# Patient Record
Sex: MALE | Race: WHITE | NOT HISPANIC OR LATINO | Employment: FULL TIME | URBAN - METROPOLITAN AREA
[De-identification: names, ages, dates, MRNs, and addresses within clinical notes are randomized per-mention and may not be internally consistent; named-entity substitution may affect disease eponyms.]

---

## 2021-06-16 PROBLEM — K21.9 CHRONIC GERD: Status: ACTIVE | Noted: 2018-06-20

## 2021-06-16 PROBLEM — E11.9 NEW ONSET TYPE 2 DIABETES MELLITUS (H): Status: ACTIVE | Noted: 2017-12-01

## 2021-11-03 ENCOUNTER — APPOINTMENT (OUTPATIENT)
Dept: CT IMAGING | Facility: CLINIC | Age: 61
End: 2021-11-03
Attending: EMERGENCY MEDICINE
Payer: COMMERCIAL

## 2021-11-03 ENCOUNTER — APPOINTMENT (OUTPATIENT)
Dept: CARDIOLOGY | Facility: CLINIC | Age: 61
End: 2021-11-03
Attending: FAMILY MEDICINE
Payer: COMMERCIAL

## 2021-11-03 ENCOUNTER — HOSPITAL ENCOUNTER (OUTPATIENT)
Facility: CLINIC | Age: 61
Discharge: HOME | End: 2021-11-04
Attending: EMERGENCY MEDICINE | Admitting: FAMILY MEDICINE
Payer: COMMERCIAL

## 2021-11-03 ENCOUNTER — APPOINTMENT (OUTPATIENT)
Dept: MRI IMAGING | Facility: CLINIC | Age: 61
End: 2021-11-03
Attending: FAMILY MEDICINE
Payer: COMMERCIAL

## 2021-11-03 DIAGNOSIS — G04.90 ENCEPHALITIS: ICD-10-CM

## 2021-11-03 DIAGNOSIS — R51.9 ACUTE NONINTRACTABLE HEADACHE, UNSPECIFIED HEADACHE TYPE: ICD-10-CM

## 2021-11-03 PROBLEM — R55 SYNCOPE: Status: ACTIVE | Noted: 2021-11-03

## 2021-11-03 PROBLEM — D69.6 THROMBOCYTOPENIA (H): Status: ACTIVE | Noted: 2021-11-03

## 2021-11-03 LAB
ALBUMIN SERPL-MCNC: 3.5 G/DL (ref 3.5–5)
ALP SERPL-CCNC: 97 U/L (ref 45–120)
ALT SERPL W P-5'-P-CCNC: 59 U/L (ref 0–45)
ANION GAP SERPL CALCULATED.3IONS-SCNC: 13 MMOL/L (ref 5–18)
APPEARANCE CSF: CLEAR
AST SERPL W P-5'-P-CCNC: 79 U/L (ref 0–40)
ATRIAL RATE - MUSE: 99 BPM
B BURGDOR IGG+IGM SER QL: 0.25
BASOPHILS # BLD MANUAL: 0 10E3/UL (ref 0–0.2)
BASOPHILS NFR BLD MANUAL: 0 %
BILIRUB SERPL-MCNC: 0.9 MG/DL (ref 0–1)
BUN SERPL-MCNC: 14 MG/DL (ref 8–22)
C GATTII+NEOFOR DNA CSF QL NAA+NON-PROBE: NEGATIVE
C REACTIVE PROTEIN LHE: 11.1 MG/DL (ref 0–0.8)
CALCIUM SERPL-MCNC: 9.2 MG/DL (ref 8.5–10.5)
CHLORIDE BLD-SCNC: 101 MMOL/L (ref 98–107)
CMV DNA CSF QL NAA+NON-PROBE: NEGATIVE
CO2 SERPL-SCNC: 23 MMOL/L (ref 22–31)
COLOR CSF: COLORLESS
CREAT SERPL-MCNC: 0.98 MG/DL (ref 0.7–1.3)
DIASTOLIC BLOOD PRESSURE - MUSE: 69 MMHG
E COLI K1 AG CSF QL: NEGATIVE
EOSINOPHIL # BLD MANUAL: 0 10E3/UL (ref 0–0.7)
EOSINOPHIL NFR BLD MANUAL: 0 %
ERYTHROCYTE [DISTWIDTH] IN BLOOD BY AUTOMATED COUNT: 13.1 % (ref 10–15)
ERYTHROCYTE [SEDIMENTATION RATE] IN BLOOD BY WESTERGREN METHOD: 19 MM/HR (ref 0–15)
EV RNA SPEC QL NAA+PROBE: NEGATIVE
GFR SERPL CREATININE-BSD FRML MDRD: 83 ML/MIN/1.73M2
GLUCOSE BLD-MCNC: 158 MG/DL (ref 70–125)
GLUCOSE BLDC GLUCOMTR-MCNC: 108 MG/DL (ref 70–99)
GLUCOSE BLDC GLUCOMTR-MCNC: 126 MG/DL (ref 70–99)
GLUCOSE BLDC GLUCOMTR-MCNC: 162 MG/DL (ref 70–99)
GLUCOSE CSF-MCNC: 93 MG/DL (ref 40–75)
GP B STREP DNA CSF QL NAA+NON-PROBE: NEGATIVE
GRAM STAIN RESULT: NORMAL
HAEM INFLU DNA CSF QL NAA+NON-PROBE: NEGATIVE
HBA1C MFR BLD: 6.7 %
HCT VFR BLD AUTO: 43.3 % (ref 40–53)
HGB BLD-MCNC: 14.2 G/DL (ref 13.3–17.7)
HHV6 DNA CSF QL NAA+NON-PROBE: NEGATIVE
HOLD SPECIMEN: NORMAL
HSV1 DNA CSF QL NAA+NON-PROBE: NEGATIVE
HSV1 DNA CSF QL NAA+PROBE: NOT DETECTED
HSV2 DNA CSF QL NAA+NON-PROBE: NEGATIVE
HSV2 DNA CSF QL NAA+PROBE: NOT DETECTED
INTERPRETATION ECG - MUSE: NORMAL
L MONOCYTOG DNA CSF QL NAA+NON-PROBE: NEGATIVE
LACTATE SERPL-SCNC: 1.6 MMOL/L (ref 0.7–2)
LYMPHOCYTES # BLD MANUAL: 1.4 10E3/UL (ref 0.8–5.3)
LYMPHOCYTES NFR BLD MANUAL: 18 %
MCH RBC QN AUTO: 30.7 PG (ref 26.5–33)
MCHC RBC AUTO-ENTMCNC: 32.8 G/DL (ref 31.5–36.5)
MCV RBC AUTO: 94 FL (ref 78–100)
MONOCYTES # BLD MANUAL: 0.4 10E3/UL (ref 0–1.3)
MONOCYTES NFR BLD MANUAL: 5 %
N MEN DNA CSF QL NAA+NON-PROBE: NEGATIVE
NEUTROPHILS # BLD MANUAL: 5.9 10E3/UL (ref 1.6–8.3)
NEUTROPHILS NFR BLD MANUAL: 77 %
P AXIS - MUSE: 70 DEGREES
PARECHOVIRUS A RNA CSF QL NAA+NON-PROBE: NEGATIVE
PLAT MORPH BLD: ABNORMAL
PLATELET # BLD AUTO: 81 10E3/UL (ref 150–450)
POTASSIUM BLD-SCNC: 3.9 MMOL/L (ref 3.5–5)
PR INTERVAL - MUSE: 128 MS
PROT CSF-MCNC: 26 MG/DL (ref 15–45)
PROT SERPL-MCNC: 7.3 G/DL (ref 6–8)
QRS DURATION - MUSE: 90 MS
QT - MUSE: 334 MS
QTC - MUSE: 428 MS
R AXIS - MUSE: 81 DEGREES
RBC # BLD AUTO: 4.63 10E6/UL (ref 4.4–5.9)
RBC CELLS COUNTED: 2
RBC MORPH BLD: ABNORMAL
S PNEUM DNA CSF QL NAA+NON-PROBE: NEGATIVE
SARS-COV-2 RNA RESP QL NAA+PROBE: NEGATIVE
SODIUM SERPL-SCNC: 137 MMOL/L (ref 136–145)
SYSTOLIC BLOOD PRESSURE - MUSE: 113 MMHG
T AXIS - MUSE: 58 DEGREES
TROPONIN I SERPL-MCNC: <0.01 NG/ML (ref 0–0.29)
TUBE # CSF: 4
VARIANT LYMPHS BLD QL SMEAR: PRESENT
VENTRICULAR RATE- MUSE: 99 BPM
VZV DNA CSF QL NAA+NON-PROBE: NEGATIVE
WBC # BLD AUTO: 7.7 10E3/UL (ref 4–11)
WBC CELLS COUNTED: 1

## 2021-11-03 PROCEDURE — 250N000013 HC RX MED GY IP 250 OP 250 PS 637: Performed by: INTERNAL MEDICINE

## 2021-11-03 PROCEDURE — 93005 ELECTROCARDIOGRAM TRACING: CPT | Performed by: EMERGENCY MEDICINE

## 2021-11-03 PROCEDURE — C9803 HOPD COVID-19 SPEC COLLECT: HCPCS

## 2021-11-03 PROCEDURE — 99285 EMERGENCY DEPT VISIT HI MDM: CPT | Mod: 25

## 2021-11-03 PROCEDURE — 87070 CULTURE OTHR SPECIMN AEROBIC: CPT | Performed by: EMERGENCY MEDICINE

## 2021-11-03 PROCEDURE — 70450 CT HEAD/BRAIN W/O DYE: CPT

## 2021-11-03 PROCEDURE — 210N000002 HC R&B HEART CARE

## 2021-11-03 PROCEDURE — 96365 THER/PROPH/DIAG IV INF INIT: CPT | Mod: 59

## 2021-11-03 PROCEDURE — 62270 DX LMBR SPI PNXR: CPT

## 2021-11-03 PROCEDURE — 83036 HEMOGLOBIN GLYCOSYLATED A1C: CPT | Performed by: FAMILY MEDICINE

## 2021-11-03 PROCEDURE — 86666 EHRLICHIA ANTIBODY: CPT | Performed by: INTERNAL MEDICINE

## 2021-11-03 PROCEDURE — 85027 COMPLETE CBC AUTOMATED: CPT | Performed by: EMERGENCY MEDICINE

## 2021-11-03 PROCEDURE — 87015 SPECIMEN INFECT AGNT CONCNTJ: CPT | Performed by: INTERNAL MEDICINE

## 2021-11-03 PROCEDURE — 99222 1ST HOSP IP/OBS MODERATE 55: CPT | Performed by: FAMILY MEDICINE

## 2021-11-03 PROCEDURE — 93306 TTE W/DOPPLER COMPLETE: CPT | Mod: 26 | Performed by: INTERNAL MEDICINE

## 2021-11-03 PROCEDURE — 250N000011 HC RX IP 250 OP 636: Performed by: EMERGENCY MEDICINE

## 2021-11-03 PROCEDURE — 85652 RBC SED RATE AUTOMATED: CPT | Performed by: EMERGENCY MEDICINE

## 2021-11-03 PROCEDURE — A9585 GADOBUTROL INJECTION: HCPCS | Performed by: FAMILY MEDICINE

## 2021-11-03 PROCEDURE — 83605 ASSAY OF LACTIC ACID: CPT | Performed by: FAMILY MEDICINE

## 2021-11-03 PROCEDURE — 87798 DETECT AGENT NOS DNA AMP: CPT | Performed by: INTERNAL MEDICINE

## 2021-11-03 PROCEDURE — 250N000013 HC RX MED GY IP 250 OP 250 PS 637: Performed by: FAMILY MEDICINE

## 2021-11-03 PROCEDURE — 36415 COLL VENOUS BLD VENIPUNCTURE: CPT | Performed by: FAMILY MEDICINE

## 2021-11-03 PROCEDURE — 36415 COLL VENOUS BLD VENIPUNCTURE: CPT | Performed by: EMERGENCY MEDICINE

## 2021-11-03 PROCEDURE — 87205 SMEAR GRAM STAIN: CPT | Performed by: EMERGENCY MEDICINE

## 2021-11-03 PROCEDURE — 84484 ASSAY OF TROPONIN QUANT: CPT | Performed by: EMERGENCY MEDICINE

## 2021-11-03 PROCEDURE — 87635 SARS-COV-2 COVID-19 AMP PRB: CPT | Performed by: EMERGENCY MEDICINE

## 2021-11-03 PROCEDURE — 99231 SBSQ HOSP IP/OBS SF/LOW 25: CPT | Performed by: INTERNAL MEDICINE

## 2021-11-03 PROCEDURE — 70545 MR ANGIOGRAPHY HEAD W/DYE: CPT

## 2021-11-03 PROCEDURE — 87483 CNS DNA AMP PROBE TYPE 12-25: CPT | Performed by: EMERGENCY MEDICINE

## 2021-11-03 PROCEDURE — 80053 COMPREHEN METABOLIC PANEL: CPT | Performed by: EMERGENCY MEDICINE

## 2021-11-03 PROCEDURE — 96375 TX/PRO/DX INJ NEW DRUG ADDON: CPT

## 2021-11-03 PROCEDURE — 86617 LYME DISEASE ANTIBODY: CPT | Performed by: EMERGENCY MEDICINE

## 2021-11-03 PROCEDURE — 36415 COLL VENOUS BLD VENIPUNCTURE: CPT | Performed by: INTERNAL MEDICINE

## 2021-11-03 PROCEDURE — 250N000011 HC RX IP 250 OP 636: Performed by: FAMILY MEDICINE

## 2021-11-03 PROCEDURE — 96361 HYDRATE IV INFUSION ADD-ON: CPT

## 2021-11-03 PROCEDURE — 87529 HSV DNA AMP PROBE: CPT | Performed by: EMERGENCY MEDICINE

## 2021-11-03 PROCEDURE — 258N000002 HC RX IP 258 OP 250: Performed by: FAMILY MEDICINE

## 2021-11-03 PROCEDURE — 84157 ASSAY OF PROTEIN OTHER: CPT | Performed by: EMERGENCY MEDICINE

## 2021-11-03 PROCEDURE — 82945 GLUCOSE OTHER FLUID: CPT | Performed by: EMERGENCY MEDICINE

## 2021-11-03 PROCEDURE — 99232 SBSQ HOSP IP/OBS MODERATE 35: CPT | Performed by: INTERNAL MEDICINE

## 2021-11-03 PROCEDURE — 255N000002 HC RX 255 OP 636: Performed by: FAMILY MEDICINE

## 2021-11-03 PROCEDURE — 86141 C-REACTIVE PROTEIN HS: CPT | Performed by: EMERGENCY MEDICINE

## 2021-11-03 PROCEDURE — 93306 TTE W/DOPPLER COMPLETE: CPT

## 2021-11-03 PROCEDURE — 258N000003 HC RX IP 258 OP 636: Performed by: EMERGENCY MEDICINE

## 2021-11-03 PROCEDURE — 86789 WEST NILE VIRUS ANTIBODY: CPT | Performed by: EMERGENCY MEDICINE

## 2021-11-03 PROCEDURE — 86618 LYME DISEASE ANTIBODY: CPT | Performed by: EMERGENCY MEDICINE

## 2021-11-03 PROCEDURE — 89050 BODY FLUID CELL COUNT: CPT | Performed by: EMERGENCY MEDICINE

## 2021-11-03 RX ORDER — KETOROLAC TROMETHAMINE 15 MG/ML
15 INJECTION, SOLUTION INTRAMUSCULAR; INTRAVENOUS ONCE
Status: COMPLETED | OUTPATIENT
Start: 2021-11-03 | End: 2021-11-03

## 2021-11-03 RX ORDER — DEXTROSE MONOHYDRATE 25 G/50ML
25-50 INJECTION, SOLUTION INTRAVENOUS
Status: DISCONTINUED | OUTPATIENT
Start: 2021-11-03 | End: 2021-11-04 | Stop reason: HOSPADM

## 2021-11-03 RX ORDER — GADOBUTROL 604.72 MG/ML
8 INJECTION INTRAVENOUS ONCE
Status: COMPLETED | OUTPATIENT
Start: 2021-11-03 | End: 2021-11-03

## 2021-11-03 RX ORDER — PANTOPRAZOLE SODIUM 20 MG/1
40 TABLET, DELAYED RELEASE ORAL EVERY MORNING
Status: DISCONTINUED | OUTPATIENT
Start: 2021-11-03 | End: 2021-11-04 | Stop reason: HOSPADM

## 2021-11-03 RX ORDER — CLOPIDOGREL BISULFATE 75 MG/1
75 TABLET ORAL EVERY MORNING
Status: DISCONTINUED | OUTPATIENT
Start: 2021-11-03 | End: 2021-11-04 | Stop reason: HOSPADM

## 2021-11-03 RX ORDER — CLOPIDOGREL BISULFATE 75 MG/1
75 TABLET ORAL EVERY MORNING
COMMUNITY

## 2021-11-03 RX ORDER — AMOXICILLIN 875 MG
875 TABLET ORAL 2 TIMES DAILY
COMMUNITY
Start: 2021-10-29 | End: 2021-11-07

## 2021-11-03 RX ORDER — METOCLOPRAMIDE HYDROCHLORIDE 5 MG/ML
10 INJECTION INTRAMUSCULAR; INTRAVENOUS ONCE
Status: COMPLETED | OUTPATIENT
Start: 2021-11-03 | End: 2021-11-03

## 2021-11-03 RX ORDER — HYDROMORPHONE HYDROCHLORIDE 1 MG/ML
0.5 INJECTION, SOLUTION INTRAMUSCULAR; INTRAVENOUS; SUBCUTANEOUS EVERY 4 HOURS PRN
Status: DISCONTINUED | OUTPATIENT
Start: 2021-11-03 | End: 2021-11-04 | Stop reason: HOSPADM

## 2021-11-03 RX ORDER — DOXYCYCLINE HYCLATE 50 MG/1
100 CAPSULE ORAL EVERY 12 HOURS SCHEDULED
Status: DISCONTINUED | OUTPATIENT
Start: 2021-11-03 | End: 2021-11-04 | Stop reason: HOSPADM

## 2021-11-03 RX ORDER — ASPIRIN 81 MG/1
81 TABLET ORAL DAILY
Status: DISCONTINUED | OUTPATIENT
Start: 2021-11-03 | End: 2021-11-04 | Stop reason: HOSPADM

## 2021-11-03 RX ORDER — SODIUM CHLORIDE 9 MG/ML
INJECTION, SOLUTION INTRAVENOUS CONTINUOUS
Status: DISCONTINUED | OUTPATIENT
Start: 2021-11-03 | End: 2021-11-03 | Stop reason: CLARIF

## 2021-11-03 RX ORDER — SODIUM CHLORIDE 450 MG/100ML
INJECTION, SOLUTION INTRAVENOUS CONTINUOUS
Status: DISCONTINUED | OUTPATIENT
Start: 2021-11-03 | End: 2021-11-03

## 2021-11-03 RX ORDER — ASPIRIN 81 MG/1
81 TABLET ORAL DAILY
COMMUNITY

## 2021-11-03 RX ORDER — NICOTINE POLACRILEX 4 MG
15-30 LOZENGE BUCCAL
Status: DISCONTINUED | OUTPATIENT
Start: 2021-11-03 | End: 2021-11-04 | Stop reason: HOSPADM

## 2021-11-03 RX ORDER — SODIUM CHLORIDE 450 MG/100ML
INJECTION, SOLUTION INTRAVENOUS CONTINUOUS
Status: DISCONTINUED | OUTPATIENT
Start: 2021-11-03 | End: 2021-11-04 | Stop reason: HOSPADM

## 2021-11-03 RX ORDER — ACETAMINOPHEN 325 MG/1
325 TABLET ORAL EVERY 4 HOURS PRN
Status: DISCONTINUED | OUTPATIENT
Start: 2021-11-03 | End: 2021-11-04 | Stop reason: HOSPADM

## 2021-11-03 RX ORDER — ATORVASTATIN CALCIUM 10 MG/1
20 TABLET, FILM COATED ORAL AT BEDTIME
Status: DISCONTINUED | OUTPATIENT
Start: 2021-11-03 | End: 2021-11-04 | Stop reason: HOSPADM

## 2021-11-03 RX ORDER — ONDANSETRON 4 MG/1
4 TABLET, FILM COATED ORAL EVERY 6 HOURS PRN
Status: DISCONTINUED | OUTPATIENT
Start: 2021-11-03 | End: 2021-11-04 | Stop reason: HOSPADM

## 2021-11-03 RX ORDER — ACETAMINOPHEN 500 MG
500-1000 TABLET ORAL EVERY 6 HOURS PRN
COMMUNITY

## 2021-11-03 RX ADMIN — SODIUM CHLORIDE: 9 INJECTION, SOLUTION INTRAVENOUS at 07:22

## 2021-11-03 RX ADMIN — METFORMIN HYDROCHLORIDE 500 MG: 500 TABLET, FILM COATED ORAL at 11:35

## 2021-11-03 RX ADMIN — ONDANSETRON HYDROCHLORIDE 4 MG: 4 TABLET, FILM COATED ORAL at 15:57

## 2021-11-03 RX ADMIN — METFORMIN HYDROCHLORIDE 500 MG: 500 TABLET, FILM COATED ORAL at 18:45

## 2021-11-03 RX ADMIN — ACETAMINOPHEN 325 MG: 325 TABLET ORAL at 15:56

## 2021-11-03 RX ADMIN — PANTOPRAZOLE SODIUM 40 MG: 20 TABLET, DELAYED RELEASE ORAL at 11:35

## 2021-11-03 RX ADMIN — ATORVASTATIN CALCIUM 20 MG: 10 TABLET, FILM COATED ORAL at 21:35

## 2021-11-03 RX ADMIN — SODIUM CHLORIDE 1000 ML: 9 INJECTION, SOLUTION INTRAVENOUS at 05:00

## 2021-11-03 RX ADMIN — HYDROMORPHONE HYDROCHLORIDE 0.5 MG: 1 INJECTION, SOLUTION INTRAMUSCULAR; INTRAVENOUS; SUBCUTANEOUS at 20:03

## 2021-11-03 RX ADMIN — HYDROMORPHONE HYDROCHLORIDE 0.5 MG: 1 INJECTION, SOLUTION INTRAMUSCULAR; INTRAVENOUS; SUBCUTANEOUS at 14:30

## 2021-11-03 RX ADMIN — HYDROMORPHONE HYDROCHLORIDE 0.5 MG: 1 INJECTION, SOLUTION INTRAMUSCULAR; INTRAVENOUS; SUBCUTANEOUS at 08:48

## 2021-11-03 RX ADMIN — SODIUM CHLORIDE 50 ML/HR: 4.5 INJECTION, SOLUTION INTRAVENOUS at 11:36

## 2021-11-03 RX ADMIN — ACYCLOVIR SODIUM 800 MG: 1000 INJECTION, SOLUTION INTRAVENOUS at 07:36

## 2021-11-03 RX ADMIN — DOXYCYCLINE HYCLATE 100 MG: 50 CAPSULE ORAL at 11:39

## 2021-11-03 RX ADMIN — GADOBUTROL 8 ML: 604.72 INJECTION INTRAVENOUS at 09:50

## 2021-11-03 RX ADMIN — CLOPIDOGREL BISULFATE 75 MG: 75 TABLET, FILM COATED ORAL at 11:35

## 2021-11-03 RX ADMIN — SODIUM CHLORIDE: 4.5 INJECTION, SOLUTION INTRAVENOUS at 22:40

## 2021-11-03 RX ADMIN — ASPIRIN 81 MG: 81 TABLET, COATED ORAL at 11:39

## 2021-11-03 RX ADMIN — METOCLOPRAMIDE 10 MG: 5 INJECTION, SOLUTION INTRAMUSCULAR; INTRAVENOUS at 05:04

## 2021-11-03 RX ADMIN — ACETAMINOPHEN 325 MG: 325 TABLET ORAL at 08:38

## 2021-11-03 RX ADMIN — KETOROLAC TROMETHAMINE 15 MG: 15 INJECTION, SOLUTION INTRAMUSCULAR; INTRAVENOUS at 05:03

## 2021-11-03 RX ADMIN — DOXYCYCLINE HYCLATE 100 MG: 50 CAPSULE ORAL at 20:03

## 2021-11-03 RX ADMIN — ONDANSETRON HYDROCHLORIDE 4 MG: 4 TABLET, FILM COATED ORAL at 23:48

## 2021-11-03 ASSESSMENT — ENCOUNTER SYMPTOMS
NAUSEA: 1
CONSTIPATION: 0
DIARRHEA: 0
NECK STIFFNESS: 0
HEADACHES: 1
NECK PAIN: 0
ABDOMINAL PAIN: 0
FATIGUE: 1
LIGHT-HEADEDNESS: 1
VOMITING: 0
DIZZINESS: 1
BACK PAIN: 0
FEVER: 0

## 2021-11-03 ASSESSMENT — ACTIVITIES OF DAILY LIVING (ADL)
DEPENDENT_IADLS:: INDEPENDENT
ADLS_ACUITY_SCORE: 3
ADLS_ACUITY_SCORE: 4
ADLS_ACUITY_SCORE: 3
ADLS_ACUITY_SCORE: 4
HEARING_DIFFICULTY_OR_DEAF: NO
ADLS_ACUITY_SCORE: 3
ADLS_ACUITY_SCORE: 3
DIFFICULTY_EATING/SWALLOWING: NO
ADLS_ACUITY_SCORE: 3
WALKING_OR_CLIMBING_STAIRS_DIFFICULTY: NO
WEAR_GLASSES_OR_BLIND: NO
ADLS_ACUITY_SCORE: 4
ADLS_ACUITY_SCORE: 4
ADLS_ACUITY_SCORE: 3
DRESSING/BATHING_DIFFICULTY: NO
DOING_ERRANDS_INDEPENDENTLY_DIFFICULTY: NO
ADLS_ACUITY_SCORE: 3
ADLS_ACUITY_SCORE: 4
CONCENTRATING,_REMEMBERING_OR_MAKING_DECISIONS_DIFFICULTY: NO
FALL_HISTORY_WITHIN_LAST_SIX_MONTHS: NO
DIFFICULTY_COMMUNICATING: NO
ADLS_ACUITY_SCORE: 4
ADLS_ACUITY_SCORE: 4
PATIENT_/_FAMILY_COMMUNICATION_STYLE: SPOKEN LANGUAGE (ENGLISH OR BILINGUAL)
ADLS_ACUITY_SCORE: 4
TOILETING_ISSUES: NO
ADLS_ACUITY_SCORE: 3

## 2021-11-03 ASSESSMENT — MIFFLIN-ST. JEOR: SCORE: 1598.99

## 2021-11-03 NOTE — PLAN OF CARE
Problem: Pain Acute  Goal: Acceptable Pain Control and Functional Ability  Outcome: Improving     Problem: Diabetes Comorbidity  Goal: Blood Glucose Level Within Targeted Range  Outcome: Improving   Pt rates his headache 3/10 after receiving Dilaudid and Tylenol.  Pt is on tele running NSR.  He has IV fluids going at 50ml/hr.  He is drinking and eating well.  He is a stand by assist d/t syncope and equipment.  His wife is in the room with the pt.  Will continue to monitor.

## 2021-11-03 NOTE — ED PROVIDER NOTES
EMERGENCY DEPARTMENT ENCOUNTER      NAME: Mark Castillo  AGE: 61 year old male  YOB: 1960  MRN: [id number]  EVALUATION DATE & TIME: 11/3/2021  4:00 AM    PCP: Mark Lugo    ED PROVIDER: Jian Samson M.D.      Chief Complaint   Patient presents with     Syncope     Headache         FINAL IMPRESSION:  1. Acute nonintractable headache, unspecified headache type    2. Encephalitis          ED COURSE & MEDICAL DECISION MAKING:    Pertinent Labs & Imaging studies reviewed. (See chart for details)  61 year old male presents to the Emergency Department for evaluation of headache.  This been progressive for the last week or so.  Normal neurologic exam here.  Has been intermittent febrile.  Has have an elevated CRP but normal white count.  Some concern for encephalopathies or encephalitis.  Less likely meningitis.  No neck stiffness.  Did do a head CT.  No abscess or bleed.  No obvious mass like lesions.  CRP is elevated.  Have a sed rate pending.  Symptoms do not seem consistent with temporal arteritis.  No visual problems.  Did do a lumbar puncture after consenting the patient.  Opening pressure is 19.  Studies are pending.  We will start him on acyclovir for possible HSV encephalitis.  No think he needs IV bacterial antibiotics as I do not think this is meningitis.  LP studies are pending.  Patient discussed with Dr. Vanegas accepted patient for admission.  Patient will wait in the ER until a bed is available.    4:13 AM I met with the patient to gather history and to perform my initial exam. I discussed the plan for care while in the Emergency Department. PPE: Provider wore gloves, goggles, and surgical mask.     At the conclusion of the encounter I discussed the results of all of the tests and the disposition. The questions were answered. The patient or family acknowledged understanding and was agreeable with the care plan.           MEDICATIONS GIVEN IN THE EMERGENCY:  Medications   0.9%  "sodium chloride BOLUS (0 mLs Intravenous Stopped 11/3/21 0613)     Followed by   sodium chloride 0.9% infusion ( Intravenous New Bag 11/3/21 0722)   acyclovir (ZOVIRAX) 800 mg in sodium chloride 0.9 % 250 mL intermittent infusion (has no administration in time range)   metoclopramide (REGLAN) injection 10 mg (10 mg Intravenous Given 11/3/21 0504)   ketorolac (TORADOL) injection 15 mg (15 mg Intravenous Given 11/3/21 0503)       NEW PRESCRIPTIONS STARTED AT TODAY'S ER VISIT  New Prescriptions    No medications on file          =================================================================    HPI    Patient information was obtained from: patient    Use of : N/A         Mark Castillo is a 61 year old male with a pertinent history of hypertension, hyperlipidemia, DM2, and ASD who presents to this ED via EMS for evaluation of headache and syncope.     Patient reports 10 days of persisting headache which feels like a dull pressure, where it was initially localized to the left side of his head but became diffuse yesterday. No history of similar headaches or head trauma preceding onset. He has seen his PCP to rule out ear infection and saw his dentist to rule out a possible infection. He woke up at 0200 this morning to take more tylenol, was slightly dizzy and nauseous, stood up slowly to walk downstairs, became lightheaded, nausea worsened, and his \"head went blank.\" Believes he had a syncopal event but cannot remember. No chest pain, palpitations, or shortness of breath before the syncopal episode. Reports associated fatigue but denies tinnitus, hearing changes, rash, vision changes, neck pain or stiffness, back pain, abdominal pain, urinary symptoms, diarrhea, constipation, or any other complaints at this time. Anticoagulated on Plavix. No recent travel or known sick contacts. Not vaccinated for Covid-19. Non-smoker.       REVIEW OF SYSTEMS   Review of Systems   Constitutional: Positive for fatigue. " Negative for fever.   HENT: Negative for hearing loss and tinnitus.    Eyes: Negative for visual disturbance.   Gastrointestinal: Positive for nausea. Negative for abdominal pain, constipation, diarrhea and vomiting.   Genitourinary: Negative.    Musculoskeletal: Negative for back pain, neck pain and neck stiffness.   Skin: Negative for rash.   Neurological: Positive for dizziness, syncope, light-headedness and headaches.   All other systems reviewed and are negative.       PAST MEDICAL HISTORY:  History reviewed. No pertinent past medical history.    PAST SURGICAL HISTORY:  History reviewed. No pertinent surgical history.        CURRENT MEDICATIONS:    Current Facility-Administered Medications   Medication     acyclovir (ZOVIRAX) 800 mg in sodium chloride 0.9 % 250 mL intermittent infusion     sodium chloride 0.9% infusion     Current Outpatient Medications   Medication     atorvastatin (LIPITOR) 20 MG tablet     omeprazole (PRILOSEC) 40 MG capsule         ALLERGIES:  No Known Allergies    FAMILY HISTORY:  History reviewed. No pertinent family history.    SOCIAL HISTORY:   Social History     Socioeconomic History     Marital status:      Spouse name: Not on file     Number of children: Not on file     Years of education: Not on file     Highest education level: Not on file   Occupational History     Not on file   Tobacco Use     Smoking status: Not on file   Substance and Sexual Activity     Alcohol use: Not on file     Drug use: Not on file     Sexual activity: Not on file   Other Topics Concern     Not on file   Social History Narrative     Not on file     Social Determinants of Health     Financial Resource Strain:      Difficulty of Paying Living Expenses:    Food Insecurity:      Worried About Running Out of Food in the Last Year:      Ran Out of Food in the Last Year:    Transportation Needs:      Lack of Transportation (Medical):      Lack of Transportation (Non-Medical):    Physical Activity:       "Days of Exercise per Week:      Minutes of Exercise per Session:    Stress:      Feeling of Stress :    Social Connections:      Frequency of Communication with Friends and Family:      Frequency of Social Gatherings with Friends and Family:      Attends Confucianism Services:      Active Member of Clubs or Organizations:      Attends Club or Organization Meetings:      Marital Status:    Intimate Partner Violence:      Fear of Current or Ex-Partner:      Emotionally Abused:      Physically Abused:      Sexually Abused:        VITALS:  /64   Pulse 96   Temp 98.7  F (37.1  C) (Oral)   Resp 21   Ht 1.727 m (5' 7.99\")   Wt 81.6 kg (180 lb)   SpO2 98%   BMI 27.38 kg/m      PHYSICAL EXAM    Physical Exam  Constitutional:       General: He is not in acute distress.     Appearance: He is not diaphoretic.   HENT:      Head: Atraumatic.   Eyes:      General: No scleral icterus.     Pupils: Pupils are equal, round, and reactive to light.   Cardiovascular:      Heart sounds: Normal heart sounds.   Pulmonary:      Effort: No respiratory distress.      Breath sounds: Normal breath sounds.   Abdominal:      General: Bowel sounds are normal.      Palpations: Abdomen is soft.      Tenderness: There is no abdominal tenderness.   Musculoskeletal:         General: No tenderness.   Skin:     General: Skin is warm.      Findings: No rash.   Neurological:      Gait: Gait normal.      Comments: 5 out of 5 strength in bilateral upper and lower extremities.  Sensation intact in all 4 extremes.  Cranial nerves intact.  No pronator drift            LAB:  All pertinent labs reviewed and interpreted.  Labs Ordered and Resulted from Time of ED Arrival to Time of ED Departure   COMPREHENSIVE METABOLIC PANEL - Abnormal       Result Value    Sodium 137      Potassium 3.9      Chloride 101      Carbon Dioxide (CO2) 23      Anion Gap 13      Urea Nitrogen 14      Creatinine 0.98      Calcium 9.2      Glucose 158 (*)     Alkaline Phosphatase " 97      AST 79 (*)     ALT 59 (*)     Protein Total 7.3      Albumin 3.5      Bilirubin Total 0.9      GFR Estimate 83     CBC WITH PLATELETS AND DIFFERENTIAL - Abnormal    WBC Count 7.7      RBC Count 4.63      Hemoglobin 14.2      Hematocrit 43.3      MCV 94      MCH 30.7      MCHC 32.8      RDW 13.1      Platelet Count 81 (*)    CRP INFLAMMATION - Abnormal    CRP 11.1 (*)    DIFFERENTIAL - Abnormal    % Neutrophils 77      % Lymphocytes 18      % Monocytes 5      % Eosinophils 0      % Basophils 0      Absolute Neutrophils 5.9      Absolute Lymphocytes 1.4      Absolute Monocytes 0.4      Absolute Eosinophils 0.0      Absolute Basophils 0.0      RBC Morphology Confirmed RBC Indices      Platelet Assessment        Value: Automated Count Confirmed. Platelet morphology is normal.    Reactive Lymphocytes Present (*)    TROPONIN I - Normal    Troponin I <0.01     LYME DISEASE TRACY WITH REFLEX TO WB SERUM   GLUCOSE CSF   PROTEIN TOTAL CSF   LYME IGG AND IGM CSF IMMUNOBLOT   WEST NILE VIRUS IGG AND IGM CSF   COVID-19 VIRUS (CORONAVIRUS) BY PCR   ERYTHROCYTE SEDIMENTATION RATE AUTO   GRAM STAIN   AEROBIC BACTERIAL CULTURE ROUTINE   MENINGITIS/ENCEPHALITIS PANEL QUAL PCR CSF   HERPES SIMPLEX VIRUS 1&2 PCR   CELL COUNT WITH DIFFERENTIAL CSF       RADIOLOGY:  Reviewed all pertinent imaging. Please see official radiology report.  CT Head w/o Contrast   Final Result   IMPRESSION:   1.  Normal head CT.          EKG:    Performed at: 405  Impression: Normal EKG.  No previous  Normal sinus rhythm ventricular rate 99.  .  QRS 90.  QTc 428    I have independently reviewed and interpreted the EKG(s) documented above.    PROCEDURES:   [facility] Procedure Note          Lumbar Puncture:      Time: 7:24 AM  Performed by: Jian Samson MD  Authorized by: Jian Samson MD    Indications: headache    Consent given by: Patient who states understanding of the procedure being performed after discussing the risks,  benefits and alternatives.    Prior to the start of the procedure and with procedural staff participation, I verbally confirmed the patient s identity using two indicators, relevant allergies, that the procedure was appropriate and matched the consent or emergent situation, and that the correct equipment/implants were available. Immediately prior to starting the procedure I conducted the Time Out with the procedural staff and re-confirmed the patient s name, procedure, and site/side. (The Joint Commission universal protocol was followed.) Yes    Under sterile conditions the patient was positioned L Lateral decubitus with knees drawn up. Betadine solution and sterile drapes were utilized.  Local anesthetic at the site: 2 ml of lidocaine 1% without epinephrine from the LP tray  A 21 G  spinal needle was inserted at the L 3-4 interspace.  Opening [id number] cm H2O pressure.  A total of 7mL of clear and colorless spinal fluid was obtained and sent to the laboratory.   After the needle was removed, a bandaid and pressure were applied and the patient was instructed to stay horizontal until the results were back.    Complications:  None    Patient tolerance: Patient tolerated the procedure well with no immediate complications.          I, Susan Lomas, am serving as a scribe to document services personally performed by Dr. Jian Samson, based on my observation and the provider's statements to me. I, Jian Samson MD attest that Susan Lomas is acting in a scribe capacity, has observed my performance of the services and has documented them in accordance with my direction.    Jian Samson M.D.  Emergency Medicine  [facility] EMERGENCY ROOM  [address] [place] [address]  [phone]  Dept: [phone]     Jian Samson MD  11/03/21 0726

## 2021-11-03 NOTE — ED TRIAGE NOTES
Pt found on floor in kitchen after sy;ncopal episode. Pt  Had prior episode when attempting to go to the bathroom for nausea on Monday.  Pt has headache for last 10 days.  Pt had prior dizziness felt at work.    Pt has had corrective action to one of his heart valves.

## 2021-11-03 NOTE — CONSULTS
Care Management Initial Consult    General Information  Assessment completed with: Patient, Spouse or significant other,    Type of CM/SW Visit: Initial Assessment    Primary Care Provider verified and updated as needed: Yes   Readmission within the last 30 days: no previous admission in last 30 days      Reason for Consult: discharge planning  Advance Care Planning: Advance Care Planning Reviewed: no concerns identified          Communication Assessment  Patient's communication style: spoken language (English or Bilingual)    Hearing Difficulty or Deaf: no   Wear Glasses or Blind: no    Cognitive  Cognitive/Neuro/Behavioral: WDL                      Living Environment:   People in home: spouse     Current living Arrangements: house      Able to return to prior arrangements: yes       Family/Social Support:  Care provided by: self  Provides care for: no one  Marital Status:   Wife          Description of Support System: Supportive, Involved    Support Assessment: Adequate family and caregiver support, Adequate social supports    Current Resources:   Patient receiving home care services: No     Community Resources: None  Equipment currently used at home: none  Supplies currently used at home: None    Employment/Financial:  Employment Status:     N/A     Financial Concerns: No concerns identified   Referral to Financial Counselor: No       Lifestyle & Psychosocial Needs:  No current concerns identified by writer or expressed by pt/wife.    Functional Status:  Prior to admission patient needed assistance:   Dependent ADLs:: Independent  Dependent IADLs:: Independent  Assesssment of Functional Status: Not at baseline with ADL Functioning    Mental Health Status:  Mental Health Status: No Current Concerns       Chemical Dependency Status:  Chemical Dependency Status: No Current Concerns             Values/Beliefs:  Spiritual, Cultural Beliefs, Restorationism Practices, Values that affect care: no             Additional  Information:  Writer met with pt and his wife to introduce Care Management services and obtain a discharge assessment. Pt states sharing a home with his wife and being totally independent with I/ADLs at baseline. No in-home services or DME utilized currently. Anticipate resolution of symptoms and a return home via wife without issue or services upon discharge.    Medical progress pending. CM to follow.    Meet Manuel RN

## 2021-11-03 NOTE — PLAN OF CARE
Problem: Adult Inpatient Plan of Care  Goal: Plan of Care Review  Outcome: Improving    Reviewed with patient and wife plan of care: labs pending, cards and infectious disease following.  Symptom management prn pain and nausea meds.  Oriented to room and routine.

## 2021-11-03 NOTE — PHARMACY-ADMISSION MEDICATION HISTORY
Pharmacy Note - Admission Medication History    Pertinent Provider Information:      ______________________________________________________________________    Prior To Admission (PTA) med list completed and updated in EMR.       PTA Med List   Medication Sig Last Dose     acetaminophen (TYLENOL) 500 MG tablet Take 500-1,000 mg by mouth every 6 hours as needed for mild pain 11/2/2021 at 2200     amoxicillin (AMOXIL) 875 MG tablet Take 875 mg by mouth 2 times daily For 10 days 11/2/2021 at 1800 - 2x     aspirin 81 MG EC tablet Take 81 mg by mouth daily 11/2/2021 at am     atorvastatin (LIPITOR) 20 MG tablet Take 20 mg by mouth At Bedtime  11/2/2021 at pm     clopidogrel (PLAVIX) 75 MG tablet Take 75 mg by mouth every morning 11/2/2021 at am     metFORMIN (GLUCOPHAGE) 500 MG tablet Take 500 mg by mouth 2 times daily (with meals) 11/2/2021 at 1x     omeprazole (PRILOSEC) 40 MG capsule Take 40 mg by mouth daily before breakfast  11/2/2021 at am       Information source(s): Patient and CareEverywhere/SureScripts  Method of interview communication: phone    Summary of Changes to PTA Med List  New: clopidogrel, metformin, aspirin, acetaminophen  Discontinued: nothing  Changed: added frequency of atorvastatin (at bedtime) and omeprazole (daily before breakfast).     Patient was asked about OTC/herbal products specifically.  PTA med list reflects this.    In the past week, patient estimated taking medication this percent of the time:  greater than 90%.    Allergies were reviewed, assessed, and updated with the patient.      Patient does not use any multi-dose medications prior to admission.    The information provided in this note is only as accurate as the sources available at the time of the update(s).    Thank you for the opportunity to participate in the care of this patient.    Jaja Pond  11/3/2021 8:21 AM

## 2021-11-03 NOTE — H&P
"[facility] MEDICINE ADMISSION HISTORY AND PHYSICAL   Date of Admission: 11/3/2021  Mark Castillo, 1960, [id number]  Code Status: No Order      Chief Complaint  syncope     HISTORY     Mark Castillo is a 61 year old male with PMH of ASD with recent percutaneous closure, presents after having 2 syncopal episodes.  Reviewing old notes from [facility] it shows that he has had episodes of vertigo in the recent past which have not been explained.  What is new and different today is 2 episodes of syncope.  The most recent was earlier this morning when he got out of bed, went downstairs in the kitchen and his wife heard a \"thud\" and found him on the floor.  According to the patient there was complete loss of consciousness for short period of time.  It was not preceded by palpitations or chest pain.  He currently is on Plavix for his ASD closure and has had regular cardiac follow-up.  Review of old notes (which are pasted below) reveal that postoperative echocardiogram and cardiac evaluation were normal with good closure.    The patient was deer hunting approximately 10 days ago in [place] and was successful and killing a deer.  He noted that the deer had multiple ticks and that some of those ticks may have gotten on him.  He also noticed 3 \"bites\" in his groin and right lower extremity but no jerri evidence of tics being implanted in his skin.  This correlates approximately with the time that his low-grade headaches occurred.  These headaches are worse with movement, and is not associated with facial pain, sore throat or ear pain.  He has visited a dentist because of the concern for headaches being caused by wisdom teeth and he was told that this was not the case.    FH: Notable for multiple members with heart disease.  ROS: Notable for hyperlipidemia and insulin resistance.  SH: Non-smoker and .    Past Medical History     No past medical history on file. "     Patient Active Problem List    Diagnosis Date Noted     Encephalitis 11/03/2021     Priority: Medium     Acute nonintractable headache, unspecified headache type 11/03/2021     Priority: Medium     Syncope 11/03/2021     Priority: Medium     Thrombocytopenia (H) 11/03/2021     Priority: Medium     Chronic GERD 06/20/2018     Priority: Medium     New onset type 2 diabetes mellitus (H) 12/01/2017     Priority: Medium     Low testosterone 05/08/2012     Priority: Medium     Dyslipidemia 09/09/2009     Priority: Medium     Hypothyroidism 09/09/2009     Priority: Medium     Psoriasis 08/06/2009     Priority: Medium     Surgical History   History reviewed. No pertinent surgical history.  Family History    History reviewed. No pertinent family history.   Social History      Social History     Tobacco Use     Smoking status: None   Substance Use Topics     Alcohol use: None     Drug use: None      Allergies   No Known Allergies  Prior to Admission Medications      Prior to Admission Medications   Prescriptions Last Dose Informant Patient Reported? Taking?   atorvastatin (LIPITOR) 20 MG tablet   Yes No   Sig: [ATORVASTATIN (LIPITOR) 20 MG TABLET] Take 20 mg by mouth.   omeprazole (PRILOSEC) 40 MG capsule   Yes No   Sig: [OMEPRAZOLE (PRILOSEC) 40 MG CAPSULE] Take 40 mg by mouth.      Facility-Administered Medications: None      Review of Systems     A 12 point comprehensive review of systems was negative except as noted above in HPI.    PHYSICAL EXAMINATION     Vitals      Temp:  [98.7  F (37.1  C)] 98.7  F (37.1  C)  Pulse:  [96-97] 96  Resp:  [21-22] 21  BP: (113-120)/(64-69) 120/64  SpO2:  [96 %-98 %] 98 %    Examination   NAD, resting on the bed and not moving head  HEENT-  --TMs clear  --Sclera and conjunctiva non-injected  --Pharynx non-erythematous  --No rhinorrhea  Neck-  --Supple, no meningeal signs, no nuchal rigidity  --No cervical lymphadenopathy  Lungs--  --No adventitious sounds  Heart-  --Regular rate and  rhythm  Skin-  --Pink and dry  Neuro-  --Moves all 4 extremities, pupils are equal      Pertinent Lab   Sodium 137, potassium 3.9, creatinine 0.98, ALT 59, AST 79, CRP 11.1, glucose 158, white count 7.7, hemoglobin 14.2, platelets 81      Pertinent Radiology     Radiology Results:   Recent Results (from the past 24 hour(s))   CT Head w/o Contrast    Narrative    EXAM: CT HEAD W/O CONTRAST  LOCATION: [facility]  DATE/TIME: 11/3/2021 6:16 AM    INDICATION: Headache, intracranial hemorrhage suspected  COMPARISON: None.  TECHNIQUE: Routine CT Head without IV contrast. Multiplanar reformats. Dose reduction techniques were used.    FINDINGS:  INTRACRANIAL CONTENTS: No intracranial hemorrhage, extraaxial collection, or mass effect.  No CT evidence of acute infarct. Normal parenchymal attenuation. Normal ventricles and sulci.     VISUALIZED ORBITS/SINUSES/MASTOIDS: No intraorbital abnormality. No paranasal sinus mucosal disease. No middle ear or mastoid effusion.    BONES/SOFT TISSUES: No acute abnormality.      Impression    IMPRESSION:  1.  Normal head CT.     EKG Results: NSR with no ST or T wave changes    -Anticoagulation   Plavix    ASSESSMENT & PLAN     Syncope  --The patient has had multiple episodes of syncope which are unexplained.  Cannot rule out cardiac cause, seizure, although seizure is unlikely.  Cannot rule out encephalitis.    Elevated LFTs  --Review of LFTs from previous visits with Naya show no evidence of elevation.  Trend.    Encephalitis  --Given the fact that he was up north in [place], harvested a deer, and was intimately involved with multiple viral vectors we will ask infectious disease to see this gentleman for further evaluation  --MRI brain    Status post ASD closure  --Echocardiogram  --Remain on telemetry  --Cardiology consult    Patient will be admitted as inpatient and I anticipate greater than 2 midnights stay.      Mónica Vanegas MD,  MD  [facility]   Phone: #[phone]          OLD CARDIOLOGY NOTE:    Cody Rosado MD - 10/12/2021 2:00 PM CDT  Formatting of this note is different from the original.  CARDIOVASCULAR MEDICINE PROGRESS NOTE  [place], [place]  [facility] at   Mark Castillo  [address] [place] [address]  61 y.o. male    SUBJECTIVE:  Mark Castillo presents for F/U. He underwent percutaneous ASD closure with Dr. Gonzales last month with favorable results. Last week he was working and developed acute vertigo and vision changes, it eventually resolved and hasn't returned. He checked blood sugar and blood pressure at the time of SX and was assessed by EMS. SX have completely resolved. He denies CP and SOB. He previously described dyspnea with exercise but hasn't run since the procedure. An echocardiogram last week showed a well seated closure device without shunt by color Doppler and agitated saline. His RV appears slightly less prominent and there is no PHTN which was present on TTE before. He denies trouble from ASA and clopidogrel.    MEDICATIONS:    aspirin (ECOTRIN) 81 mg enteric coated tablet, Take 1 tablet by mouth once daily with a meal., Disp: 90 tablet, Rfl: 3    atorvastatin (LIPITOR) 20 mg tablet, Take 1 Tablet (20 mg) by mouth once daily. Needs to be seen., Disp: 90 tablet., Rfl: 3    blood sugar diagnostic (CONTOUR NEXT TEST STRIPS) strip, Dispense item covered by pt ins. E11.9 IDDM type II - Test 1 time/day, Disp: 100 Each, Rfl: 2    blood-glucose meter, Dispense meter covered by pt ins. E11.9 NIDDM type II - Test 1 time/day, Disp: 1 Device, Rfl: 0    clopidogreL (PLAVIX) 75 mg tablet, Take 1 Tablet (75 mg) by mouth once daily. For 6 months after ASD closure., Disp: 90 Tablet, Rfl: 2    lancets, Dispense item covered by pt ins. E11.9 IDDM type II - Test 1 time/day, Disp: 100 Each, Rfl: 2    metFORMIN (GLUCOPHAGE) 500 mg  "tablet, Take 1 Tablet (500 mg) by mouth 2 times daily with meals., Disp: 180 tablet., Rfl: 3    omeprazole (PRILOSEC) 40 mg Delayed-Release capsule, Take 1 Capsule (40 mg) by mouth once daily before a meal., Disp: 90 capsule., Rfl: 3  Medications have been reviewed by me and are current to the best of my knowledge and ability.    OBJECTIVE:  /72 (Cuff Site: Left Arm, Position: Sitting, Cuff Size: Adult Large)  Pulse 87  Resp 16  Ht 1.727 m (5' 8\")  Wt 83 kg (183 lb)  SpO2 97%  BMI 27.83 kg/m      EXAMINATION:  General - Alert, oriented, pleasant, cooperative, no distress  Neck - No jugular venous distention  CV - Regular rate and rhythm; normal S1 & S2; soft I/VI holosys murmur  Lungs - Clear to auscultation bilaterally  Abdomen - Soft  Extremities - No clubbing or cyanosis, no edema    DATA:  TTE 10/7/21 - personally reviewed  Final Conclusion Previous Study: 06/16/21  1. Normal left ventricular chamber size and systolic function. Estimated left ventricular ejection fraction is 55-60%.  2. Mild right ventricular chamber enlargement. Normal right ventricular systolic function. Estimated right ventricular systolic  pressure is 26 mmHg.  3. Status post percutaneous atrial septal defect repair with 22-mm Amplatzer Septal Occluder device 9/17/21. No evidence of inter-  atrial shunt by color flow Doppler and agitated saline injection.  4. No significant valvular heart disease.    ASSESSMENT:  1. ASD (atrial septal defect)   2. Status post device closure of ASD   3. Enlarged RV (right ventricle)   4. Vertigo   5. Mixed hyperlipidemia     RECOMMENDATIONS:    Unclear etiology of his vertigo, given resolution and no other neurologic SX I would not advise additional testing at this point.    He is schedule to have post-ASD F/U with Dr. Gonzales    Continue ASA and clopidogrel    I am happy to see him in the future as needed.    Cody Rosado M.D., F.A.C.C. 10/12/2021  Cardiovascular Medicine  [place], " [place]

## 2021-11-03 NOTE — CONSULTS
Cardiology Consult Note    Thank you, Dr. Vanegas, for asking the [facility] team to see Mark Castillo in consultation at [facility] to evaluate recurrent syncope.      Assessment:   1.  Recurrent syncope, possibly due to vasovagal mechanism.  Patient describes feeling nauseated and getting up to go to the bathroom to vomit where he has become lightheaded and lost consciousness.  No symptoms of palpitations or chest discomfort.  No recent shortness of breath.  ECG in the ED demonstrates normal sinus rhythm without evidence of any conduction abnormality.  No arrhythmias identified thus far.  Will obtain echocardiogram given recent percutaneous ASD closure to verify no abnormality.  2.  Severe headache, low-grade fever.  CRP markedly elevated.  Head MRI negative.  Seen by infectious disease who feels he may have a tickborne illness.  To start doxycycline.  3.  Status post ASD closure with Amplatz device September 2021.  Echocardiogram in early October demonstrated normal device position with no evidence of interatrial shunt by bubble study.     Plan:   1.  Continue telemetry monitoring  2.  Repeat echocardiogram to verify no shift in his device and no obvious color flow across the device  3.  Begin doxycycline dosing as recommended by infectious disease    Primary cardiologist: Dr. Gonzales     Current History:   Mr. Mark Castillo is a 61 year old male with history of secundum ASD, status post percutaneous closure with an Amplatzer in September 2021, GERD, dyslipidemia and hypothyroidism who presented to the ED for evaluation of recurrent syncope.  Patient states he had done fairly well after his cardiac procedure until early October when he developed sudden onset of spinning sensation and nausea while sitting in his crane.  Ultimately he was helped down and evaluated by paramedics where no abnormality was identified.  He subsequently contacted his cardiologist and was advised to  come in for an echocardiogram which showed no change in position of his ASD closure device and no evidence of residual interatrial shunt.  He was well until about 10 days ago when he began to experience intermittent severe headaches, low-grade fever and chills.  The symptoms have persisted during this time.  He has been taking acetaminophen every 4-6 hours because of the severe pain in his head.  24 hours ago, he awakened in the early morning with symptoms of nausea.  He got up and headed to the bathroom where he thought he was going to vomit.  In route he became lightheaded and subsequently passed out falling to the floor.  When he came to, he was aware of his surroundings.  Was not evaluated yesterday but this morning had a similar episode where he awakened at about 3 or 4 in the morning feeling nauseated.  He got up and headed to the bathroom where he thought he was going to vomit.  He became lightheaded and again lost consciousness.  His wife heard him strike the floor and came in where he was aware of his surroundings.  Paramedics were summoned and he was brought to the ED here for evaluation.  His rhythm was stable in route showing sinus rhythm to sinus tachycardia.  No arrhythmias identified.  Initial cardiac work-up has been unrevealing including an EKG again without any ischemic changes or evidence of conduction abnormality.    Past Medical History:   -GERD  -Secundum ASD status post percutaneous closure device September 2021  -Dyslipidemia  -Hypothyroidism  -New onset type 2 diabetes mellitus    Past Surgical History:   History reviewed. No pertinent surgical history.    Family History:   Family history of cardiomyopathy and coronary artery disease    Social History:    reports that he has never smoked. He does not have any smokeless tobacco history on file.    Meds:     Current Facility-Administered Medications:      acetaminophen (TYLENOL) tablet 325 mg, 325 mg, Oral, Q4H PRN, Mónica Vanegas MD,  325 mg at 11/03/21 0838     aspirin EC tablet 81 mg, 81 mg, Oral, Daily, Mónica Vanegas MD     atorvastatin (LIPITOR) tablet 20 mg, 20 mg, Oral, At Bedtime, Mónica Vanegas MD     clopidogrel (PLAVIX) tablet 75 mg, 75 mg, Oral, QAM, Mónica Vanegas MD     glucose gel 15-30 g, 15-30 g, Oral, Q15 Min PRN **OR** dextrose 50 % injection 25-50 mL, 25-50 mL, Intravenous, Q15 Min PRN **OR** glucagon injection 1 mg, 1 mg, Subcutaneous, Q15 Min PRN, Mónica Vanegas MD     doxycycline hyclate (VIBRAMYCIN) capsule 100 mg, 100 mg, Oral, Q12H JOSUE, Mony Eaton MD     HYDROmorphone (PF) (DILAUDID) injection 0.5 mg, 0.5 mg, Intravenous, Q4H PRN, Mónica Vanegas MD, 0.5 mg at 11/03/21 0848     insulin aspart (NovoLOG) injection (RAPID ACTING), 1-3 Units, Subcutaneous, TID AC, Mónica Vanegas MD     insulin aspart (NovoLOG) injection (RAPID ACTING), 1-3 Units, Subcutaneous, At Bedtime, Mónica Vanegas MD     metFORMIN (GLUCOPHAGE) tablet 500 mg, 500 mg, Oral, BID w/meals, Mónica Vanegas MD     ondansetron (ZOFRAN) tablet 4 mg, 4 mg, Oral, Q6H PRN, Mónica Vanegas MD     pantoprazole (PROTONIX) EC tablet 40 mg, 40 mg, Oral, QAM, Mónica Vanegas MD     sodium chloride 0.45% infusion, , Intravenous, Continuous, Mónica Vanegas MD     [COMPLETED] 0.9% sodium chloride BOLUS, 1,000 mL, Intravenous, Once, Stopped at 11/03/21 0613 **FOLLOWED BY** sodium chloride 0.9% infusion, , Intravenous, Continuous, Jian Samson MD, Last Rate: 125 mL/hr at 11/03/21 0722, New Bag at 11/03/21 0722    Current Outpatient Medications:      acetaminophen (TYLENOL) 500 MG tablet, Take 500-1,000 mg by mouth every 6 hours as needed for mild pain, Disp: , Rfl:      amoxicillin (AMOXIL) 875 MG tablet, Take 875 mg by mouth 2 times daily For 10 days, Disp: , Rfl:      aspirin 81 MG EC tablet, Take 81 mg by mouth daily, Disp: , Rfl:      atorvastatin (LIPITOR) 20 MG tablet, Take 20 mg by  "mouth At Bedtime , Disp: , Rfl:      clopidogrel (PLAVIX) 75 MG tablet, Take 75 mg by mouth every morning, Disp: , Rfl:      metFORMIN (GLUCOPHAGE) 500 MG tablet, Take 500 mg by mouth 2 times daily (with meals), Disp: , Rfl:      omeprazole (PRILOSEC) 40 MG capsule, Take 40 mg by mouth daily before breakfast , Disp: , Rfl:     aspirin  81 mg Oral Daily     atorvastatin  20 mg Oral At Bedtime     clopidogrel  75 mg Oral QAM     doxycycline hyclate  100 mg Oral Q12H JOSUE     insulin aspart  1-3 Units Subcutaneous TID AC     insulin aspart  1-3 Units Subcutaneous At Bedtime     metFORMIN  500 mg Oral BID w/meals     pantoprazole  40 mg Oral QAM       Allergies:   Patient has no known allergies.    Review of Systems:   A 12 point comprehensive review of systems was negative except as noted in the current history.    Objective:      Physical Exam  Body mass index is 27.38 kg/m .  /64   Pulse 97   Temp 97.9  F (36.6  C)   Resp 17   Ht 1.727 m (5' 7.99\")   Wt 81.6 kg (180 lb)   SpO2 99%   BMI 27.38 kg/m      General Appearance:    Well-developed, well-nourished middle-age male in no acute distress with exception of ongoing headache   HEENT:   Normocephalic, atraumatic.  Sclera nonicteric.  Mucous membranes moist   Neck:  No jugular venous distention or carotid bruits   Chest:  Symmetric with normal AP diameter   Lungs:    Clear to auscultation and percussion bilaterally   Cardiovascular:    Regular rate and rhythm.  S1, S2 normal.  No murmur or gallop   Abdomen:   Soft, nondistended, nontender.  No organomegaly or mass.   Extremities:  No peripheral edema, clubbing or cyanosis.  Distal pulses symmetric   Skin:  No rash or lesions   Neurologic:  Alert and oriented x3.  No gross focal deficits.             Cardiographics (personally reviewed)  ECG demonstrates normal sinus rhythm.  No conduction abnormality.  No acute ST or T wave abnormality    Imaging (personally reviewed)  Echocardiogram pending    Lab Review " (personally reviewed all results)  No results for input(s): CHOL, HDL, LDL, TRIG, CHOLHDLRATIO in the last 05895 hours.  No results for input(s): LDL in the last 94932 hours.  Recent Labs   Lab Test 11/03/21 0459      POTASSIUM 3.9   CHLORIDE 101   CO2 23   *   BUN 14   CR 0.98   GFRESTIMATED 83   GUIDO 9.2     Recent Labs   Lab Test 11/03/21 0459   CR 0.98     No results for input(s): A1C in the last 95086 hours.       Recent Labs   Lab Test 11/03/21 0459   WBC 7.7   HGB 14.2   HCT 43.3   MCV 94   PLT 81*     Recent Labs   Lab Test 11/03/21 0459   HGB 14.2    Recent Labs   Lab Test 11/03/21 0459   TROPONINI <0.01     No results for input(s): BNP, NTBNPI, NTBNP in the last 47198 hours.  No results for input(s): TSH in the last 62972 hours.  No results for input(s): INR in the last 24504 hours.

## 2021-11-03 NOTE — CONSULTS
Consultation - INFECTIOUS DISEASE CONSULTATION  Mark Castillo ,  1960, MRN [id number]      Encephalitis [G04.90]    PCP: Mark Lugo, None   Code status:  Full Code               Assessment:  61M with recent tick exposure admitted with syncope, chills  1. Chills/sweats, headaches, fatigue, elevated LFTs, thrombocytopenia: with recent tick exposure concerning for tick borne illness most likely anaplasmosis or ehrlichia but will evaluate for babesia. Had normal LFTs 6 weeks ago. Low concern for meningitis based on reassuring clinical exam; LP with 1 WBC, MRI normal.   2. Unvaccinated COVID: plans on getting vaccinated when he feels better, encouraged this.     Principal Problem:    Syncope  Active Problems:    Encephalitis    Acute nonintractable headache, unspecified headache type    Thrombocytopenia (H)        Recommendations:   - doxycyline 100mg PO BID  - anaplasma, ehrlichia, babesia studies  - trend LFTs, cbc   - stop acyclovir, low suspicion for meningitis  - ID will follow, thanks    Mony Eaton MD  [facility]  [phone]       HPI:    Mark Castillo is a 61 year old old male. History is provided by patient, chart, family.  Around 10/17 went hunting, ticks presents, small ones all over his skin didn't notice a bit. Had some bites in his groin, healed over now and no rashes. For past 10 days, he's had headaches, initially left sided then now all over the head. No neck stiffness, no difficulty moving his head around. For past 10 days, chills and night sweats, no documented fevers. Has been taking around the clock tylenol which keeps symptoms better. Past 2 days, episode of syncope with improvement between episodes. Came in because 30 seconds after getting up, he had nausea and passed out, laid on floor. CT and MRI are ok. LP without elevated WBC.   Works at [employer] in [place].     Chief complaint: Principal Problem:    Syncope  Active Problems:     Encephalitis    Acute nonintractable headache, unspecified headache type    Thrombocytopenia (H)      Medical History  Active Ambulatory Problems     Diagnosis Date Noted     Chronic GERD 06/20/2018     Dyslipidemia 09/09/2009     Hypothyroidism 09/09/2009     Low testosterone 05/08/2012     New onset type 2 diabetes mellitus (H) 12/01/2017     Psoriasis 08/06/2009     Resolved Ambulatory Problems     Diagnosis Date Noted     No Resolved Ambulatory Problems     No Additional Past Medical History         Surgical History  He  has no past surgical history on file.       Social History  Reviewed, and he  reports that he has never smoked. He does not have any smokeless tobacco history on file.        Family History  Reviewed and noncontributory to present problem, and no FH frequent infections.    Psychosocial Needs  Social History     Social History Narrative     Not on file     Additional psychosocial needs reviewed per nursing assessment.       No Known Allergies   (Not in a hospital admission)       Review of Systems:  A 12 point comprehensive review of systems was negative except as noted. Physical Exam:  Temp:  [97.9  F (36.6  C)-98.7  F (37.1  C)] 97.9  F (36.6  C)  Pulse:  [96-97] 97  Resp:  [17-22] 17  BP: (113-120)/(64-69) 120/64  SpO2:  [96 %-99 %] 99 %    GEN: alert and oriented x3, NAD  HEAD: atraumatic  ENT: moist membranes, no thrush, anicteric sclera   NECK: supple, NO nuchal rigidity  CARDIOVASCULAR: regular rate and rhythm, no murmurs, rubs, or gallops  PULMONARY: lungs clear to ausculation bilaterally  ABDOMEN: soft, nontender, nondistended. Normal bowel sounds  SKIN: no rashes or lesions. No stigma of endocarditis. Psoriasis plaques. No EM.   LYMPHADENOPATHY: no cervical, supraclavicular lymphadenopathy  PSYCH: grossly intact  MUSCULOSKELETAL: no synovitis               Pertinent Labs  personally reviewed.   CBC RESULTS:   Recent Labs   Lab Test 11/03/21  0459   WBC 7.7   RBC 4.63   HGB 14.2   HCT  43.3   MCV 94   MCH 30.7   MCHC 32.8   RDW 13.1   PLT 81*        Last Comprehensive Metabolic Panel:  Sodium   Date Value Ref Range Status   11/03/2021 137 136 - 145 mmol/L Final     Potassium   Date Value Ref Range Status   11/03/2021 3.9 3.5 - 5.0 mmol/L Final     Chloride   Date Value Ref Range Status   11/03/2021 101 98 - 107 mmol/L Final     Carbon Dioxide (CO2)   Date Value Ref Range Status   11/03/2021 23 22 - 31 mmol/L Final     Anion Gap   Date Value Ref Range Status   11/03/2021 13 5 - 18 mmol/L Final     Glucose   Date Value Ref Range Status   11/03/2021 158 (H) 70 - 125 mg/dL Final     Urea Nitrogen   Date Value Ref Range Status   11/03/2021 14 8 - 22 mg/dL Final     Creatinine   Date Value Ref Range Status   11/03/2021 0.98 0.70 - 1.30 mg/dL Final     GFR Estimate   Date Value Ref Range Status   11/03/2021 83 >60 mL/min/1.73m2 Final     Comment:     As of July 11, 2021, eGFR is calculated by the CKD-EPI creatinine equation, without race adjustment. eGFR can be influenced by muscle mass, exercise, and diet. The reported eGFR is an estimation only and is only applicable if the renal function is stable.     Calcium   Date Value Ref Range Status   11/03/2021 9.2 8.5 - 10.5 mg/dL Final       The following microbiology studies were personally reviewed:  No results found for: CULT    Urine Studies  No lab results found.    Vancomycin Levels  No lab results found.    Invalid input(s): VANCO    MICROBIOLOGY DATA:  11/3 CSF cultures pending. GS PMNs    CSF:   4       Color Colorless Colorless     Clarity Clear Clear     WBC Cells Counted  1     RBC Cells Counted  2          Pertinent Radiology  personally reviewed.     Recent Results (from the past 24 hour(s))   CT Head w/o Contrast    Narrative    EXAM: CT HEAD W/O CONTRAST  LOCATION: [facility]  DATE/TIME: 11/3/2021 6:16 AM    INDICATION: Headache, intracranial hemorrhage suspected  COMPARISON: None.  TECHNIQUE: Routine CT Head without  IV contrast. Multiplanar reformats. Dose reduction techniques were used.    FINDINGS:  INTRACRANIAL CONTENTS: No intracranial hemorrhage, extraaxial collection, or mass effect.  No CT evidence of acute infarct. Normal parenchymal attenuation. Normal ventricles and sulci.     VISUALIZED ORBITS/SINUSES/MASTOIDS: No intraorbital abnormality. No paranasal sinus mucosal disease. No middle ear or mastoid effusion.    BONES/SOFT TISSUES: No acute abnormality.      Impression    IMPRESSION:  1.  Normal head CT.   MR Brain COW Carotid w Contrast    [facility]  MR BRAIN COW CAROTID W CONTRAST  11/3/2021 8:57 AM    INDICATION: Dizziness, non-specific.   TECHNIQUE:   HEAD MRI: Multiplanar, multisequence MR images of the head were obtained without and with intravenous contrast.  HEAD MRA: 3D time-of-flight head MRA without intravenous contrast.  NECK MRA: MRA images of the neck were obtained with intravenous contrast.  CONTRAST: 8 cc of Gadavist administered intravenously.  COMPARISON: Head CT 11/03/2021.   FINDINGS:    HEAD MRI:     There is no evidence of acute infarction, intracranial hemorrhage or mass lesion. There is mild scattered nonspecific T2 hyperintensity in the cerebral white matter. There is no abnormal intracranial enhancement.    The ventricles, sulci, and cisterns are within normal limits. There is no evidence of hydrocephalus.    The major intracranial arterial flow voids appear patent.     The globes and orbits are within normal limits. The paranasal sinuses and mastoid air cells are clear. The visualized bones and extracranial soft tissues are within normal limits.    HEAD MRA:     There is no evidence of cerebral aneurysm, high flow vascular malformation, or significant intracranial arterial stenosis.    NECK MRA:      There is a three-vessel, left-sided aortic arch. There is no significant stenosis of the brachiocephalic or subclavian arteries bilaterally.    There is  no significant stenosis of the common carotid or cervical internal carotid arteries bilaterally. There is no evidence of carotid artery dissection.    The vertebral arteries are codominant. There is no significant stenosis of the extradural vertebral arteries bilaterally. There is no evidence of vertebral artery dissection.      Impression    CONCLUSION:    HEAD MRI:    1. No evidence of acute infarction, intracranial hemorrhage, or mass lesion.    HEAD MRA:    1. Normal head MRA.    NECK MRA:    1. Normal neck MRA.

## 2021-11-04 VITALS
RESPIRATION RATE: 16 BRPM | BODY MASS INDEX: 27.37 KG/M2 | OXYGEN SATURATION: 97 % | SYSTOLIC BLOOD PRESSURE: 111 MMHG | DIASTOLIC BLOOD PRESSURE: 57 MMHG | TEMPERATURE: 97.5 F | HEART RATE: 74 BPM | WEIGHT: 180.6 LBS | HEIGHT: 68 IN

## 2021-11-04 LAB
A PHAG+EHRL SP DNA PNL BLD NAA+NON-PROBE: NEGATIVE
A PHAGOCYTOPH DNA BLD QL NAA+PROBE: NEGATIVE
ALBUMIN SERPL-MCNC: 2.8 G/DL (ref 3.5–5)
ALP SERPL-CCNC: 76 U/L (ref 45–120)
ALT SERPL W P-5'-P-CCNC: 46 U/L (ref 0–45)
ANION GAP SERPL CALCULATED.3IONS-SCNC: 8 MMOL/L (ref 5–18)
AST SERPL W P-5'-P-CCNC: 54 U/L (ref 0–40)
BABESIA SPEC: NEGATIVE
BILIRUB SERPL-MCNC: 0.5 MG/DL (ref 0–1)
BUN SERPL-MCNC: 15 MG/DL (ref 8–22)
CALCIUM SERPL-MCNC: 8.6 MG/DL (ref 8.5–10.5)
CHLORIDE BLD-SCNC: 103 MMOL/L (ref 98–107)
CO2 SERPL-SCNC: 23 MMOL/L (ref 22–31)
CREAT SERPL-MCNC: 1.01 MG/DL (ref 0.7–1.3)
ERYTHROCYTE [DISTWIDTH] IN BLOOD BY AUTOMATED COUNT: 13.3 % (ref 10–15)
GFR SERPL CREATININE-BSD FRML MDRD: 80 ML/MIN/1.73M2
GLUCOSE BLD-MCNC: 160 MG/DL (ref 70–125)
GLUCOSE BLDC GLUCOMTR-MCNC: 127 MG/DL (ref 70–99)
GLUCOSE BLDC GLUCOMTR-MCNC: 161 MG/DL (ref 70–99)
HCT VFR BLD AUTO: 38 % (ref 40–53)
HGB BLD-MCNC: 12.5 G/DL (ref 13.3–17.7)
MCH RBC QN AUTO: 30.7 PG (ref 26.5–33)
MCHC RBC AUTO-ENTMCNC: 32.9 G/DL (ref 31.5–36.5)
MCV RBC AUTO: 93 FL (ref 78–100)
PLATELET # BLD AUTO: 95 10E3/UL (ref 150–450)
POTASSIUM BLD-SCNC: 4.1 MMOL/L (ref 3.5–5)
PROT SERPL-MCNC: 6.3 G/DL (ref 6–8)
RBC # BLD AUTO: 4.07 10E6/UL (ref 4.4–5.9)
SODIUM SERPL-SCNC: 134 MMOL/L (ref 136–145)
WBC # BLD AUTO: 8.6 10E3/UL (ref 4–11)

## 2021-11-04 PROCEDURE — 36415 COLL VENOUS BLD VENIPUNCTURE: CPT | Performed by: FAMILY MEDICINE

## 2021-11-04 PROCEDURE — 250N000013 HC RX MED GY IP 250 OP 250 PS 637: Performed by: INTERNAL MEDICINE

## 2021-11-04 PROCEDURE — 99217 PR OBSERVATION CARE DISCHARGE: CPT | Performed by: FAMILY MEDICINE

## 2021-11-04 PROCEDURE — 96372 THER/PROPH/DIAG INJ SC/IM: CPT | Performed by: FAMILY MEDICINE

## 2021-11-04 PROCEDURE — G0378 HOSPITAL OBSERVATION PER HR: HCPCS

## 2021-11-04 PROCEDURE — 85027 COMPLETE CBC AUTOMATED: CPT | Performed by: FAMILY MEDICINE

## 2021-11-04 PROCEDURE — 250N000012 HC RX MED GY IP 250 OP 636 PS 637: Performed by: FAMILY MEDICINE

## 2021-11-04 PROCEDURE — 82040 ASSAY OF SERUM ALBUMIN: CPT | Performed by: FAMILY MEDICINE

## 2021-11-04 PROCEDURE — 99207 PR CDG-CODE CATEGORY CHANGED: CPT | Performed by: FAMILY MEDICINE

## 2021-11-04 PROCEDURE — 250N000013 HC RX MED GY IP 250 OP 250 PS 637: Performed by: FAMILY MEDICINE

## 2021-11-04 RX ORDER — DOXYCYCLINE 100 MG/1
100 CAPSULE ORAL EVERY 12 HOURS
Qty: 20 CAPSULE | Refills: 0 | Status: SHIPPED | OUTPATIENT
Start: 2021-11-04

## 2021-11-04 RX ADMIN — CLOPIDOGREL BISULFATE 75 MG: 75 TABLET, FILM COATED ORAL at 09:47

## 2021-11-04 RX ADMIN — INSULIN ASPART 1 UNITS: 100 INJECTION, SOLUTION INTRAVENOUS; SUBCUTANEOUS at 13:01

## 2021-11-04 RX ADMIN — PANTOPRAZOLE SODIUM 40 MG: 20 TABLET, DELAYED RELEASE ORAL at 09:44

## 2021-11-04 RX ADMIN — DOXYCYCLINE HYCLATE 100 MG: 50 CAPSULE ORAL at 09:45

## 2021-11-04 RX ADMIN — METFORMIN HYDROCHLORIDE 500 MG: 500 TABLET, FILM COATED ORAL at 09:46

## 2021-11-04 RX ADMIN — ASPIRIN 81 MG: 81 TABLET, COATED ORAL at 09:46

## 2021-11-04 RX ADMIN — ACETAMINOPHEN 325 MG: 325 TABLET ORAL at 02:41

## 2021-11-04 ASSESSMENT — ACTIVITIES OF DAILY LIVING (ADL)
ADLS_ACUITY_SCORE: 3
ADLS_ACUITY_SCORE: 5
ADLS_ACUITY_SCORE: 3
ADLS_ACUITY_SCORE: 5
ADLS_ACUITY_SCORE: 5

## 2021-11-04 ASSESSMENT — MIFFLIN-ST. JEOR: SCORE: 1603.7

## 2021-11-04 NOTE — DISCHARGE SUMMARY
[facility] MEDICINE  DISCHARGE SUMMARY     Primary Care Physician: Mark Lugo  Admission Date: 11/3/2021   Discharge Provider: Mónica Vanegas MD, MD Discharge Date: 11/4/2021   Diet:   Active Diet and Nourishment Order   Procedures     Consistent Carb 60 grams CHO per Meal Diet     Diet       Code Status: Full Code   Activity: As tolerated           REASON FOR PRESENTATION(See Admission Note for Details)   Fever and headache    PRINCIPAL & ACTIVE DISCHARGE DIAGNOSES     Principal Problem:    Syncope  Active Problems:    Encephalitis    Acute nonintractable headache, unspecified headache type    Thrombocytopenia (H)      PENDING LABS     Unresulted Labs Ordered in the Past 30 Days of this Admission     Date and Time Order Name Status Description    11/3/2021 11:13 AM Babesia Species by PCR In process     11/3/2021 11:13 AM Blood parasitology exam Preliminary     11/3/2021 11:13 AM Anaplasma phagocytoph Antibodies IgG IgM In process     11/3/2021 11:13 AM Ehrlichia Anaplasma Sp by PCR In process     11/3/2021  5:41 AM West Nile Virus IgG and IgM CSF: In process     11/3/2021  5:41 AM Lyme IgG and IgM CSF Immunoblot: In process     11/3/2021  5:41 AM Cerebrospinal fluid Aerobic Bacterial Culture Routine Preliminary             PROCEDURES ( this hospitalization only)          RECOMMENDATIONS TO OUTPATIENT PROVIDER FOR F/U VISIT     Follow-up with your primary care physician within 7 days for recheck of your laboratory results.    DISPOSITION   Home      SUMMARY OF HOSPITAL COURSE:      Very pleasant 61-year-old gentleman who was hunting in [place] proximally 10 days ago, presented with increasing headache and occasional fever.  Full work-up was undertaken including head MRI, lumbar tap, and full laboratory including viral cultures.  Everything essentially remain negative and it was thought that his most likely was a tickborne disease.  He was seen by  infectious these colleagues who felt that he should be discharged on doxycycline 100 mg twice daily for 10 days and to be followed up with his primary care physician.  He will be discharged with doxycycline, and asked to follow-up with his private physician.    Discharge Medications with Med changes:     Current Discharge Medication List      START taking these medications    Details   doxycycline hyclate (VIBRAMYCIN) 100 MG capsule Take 1 capsule (100 mg) by mouth every 12 hours  Qty: 20 capsule, Refills: 0    Associated Diagnoses: Encephalitis         CONTINUE these medications which have NOT CHANGED    Details   acetaminophen (TYLENOL) 500 MG tablet Take 500-1,000 mg by mouth every 6 hours as needed for mild pain      amoxicillin (AMOXIL) 875 MG tablet Take 875 mg by mouth 2 times daily For 10 days      aspirin 81 MG EC tablet Take 81 mg by mouth daily      atorvastatin (LIPITOR) 20 MG tablet Take 20 mg by mouth At Bedtime       clopidogrel (PLAVIX) 75 MG tablet Take 75 mg by mouth every morning      metFORMIN (GLUCOPHAGE) 500 MG tablet Take 500 mg by mouth 2 times daily (with meals)      omeprazole (PRILOSEC) 40 MG capsule Take 40 mg by mouth daily before breakfast                      Consults     CARDIOLOGY IP CONSULT  INFECTIOUS DISEASES IP CONSULT  SOCIAL WORK IP CONSULT    Immunizations given this encounter       There is no immunization history on file for this patient.        Anticoagulation Information    None      SIGNIFICANT IMAGING FINDINGS     Results for orders placed or performed during the hospital encounter of 11/03/21   CT Head w/o Contrast    Impression    IMPRESSION:  1.  Normal head CT.   MR Brain COW Carotid w Contrast    Impression    CONCLUSION:    HEAD MRI:    1. No evidence of acute infarction, intracranial hemorrhage, or mass lesion.    HEAD MRA:    1. Normal head MRA.    NECK MRA:    1. Normal neck MRA.         SIGNIFICANT LABORATORY FINDINGS   Viral labs negative including negative  for Lyme's disease    Discharge Orders        Reason for your hospital stay    Please see AVS     Activity    Activity as nora     Follow-up and recommended labs and tests    Primary physician within 7 days.     Full Code     Diet    Diabetic       Examination   Alert conversant no distress  Skin Pink and dry  Respiratory-no distress      Please see EMR for more detailed significant labs, imaging, consultant notes etc.    > 35 minutes spent in discharge process.    Mónica Vaengas MD, MD  [facility]    CC:Badroos, Peter A

## 2021-11-04 NOTE — PROGRESS NOTES
Chart note only.  Telemetry history is reviewed.  No rhythm issues since admission.  Echocardiogram completed yesterday demonstrates well-positioned ASD closure device with no flow around the device.  Remainder of his echocardiogram unrevealing.  At this point doubt cardiac etiology for recent syncopal events other than vasovagal mediated due to nausea.  Will sign off at this time.  Call if further questions.

## 2021-11-04 NOTE — UTILIZATION REVIEW
"Admission Status; Secondary Review Determination     Under the authority of the Utilization Management Committee, the utilization review process indicated a secondary review on Mark Castillo.  The review outcome is based on review of the medical records, discussions with staff, and applying clinical experience noted on the date of the review.     (x) Observation Status Appropriate - This patient does not meet hospital inpatient criteria and is placed in observation status. If this patient's primary payer is Medicare and was admitted as an inpatient, Condition Code 44 should be used and patient status changed to \"observation\".     RATIONALE FOR DETERMINATION   61-year-old male admitted with headache and fever.  Had MRI, lumbar And full laboratory work-up including viral cultures was done and are thus far negative.  ID consulted and this was thought likely a tickborne illness and he will be discharged on oral doxycycline.  Cardiology consulted secondary to recurrent syncope, possibly due to vasovagal mechanism.  Echocardiogram shows no acute abnormalities.  He is status post ASD closure with Amplatz device in September 2021.    The severity of illness, intensity of service provided, expected LOS and risk for adverse outcome make the care appropriate for further observation; however, doesn't meet criteria for hospital inpatient admission. Dr Vanegas notified of this determination and agrees with downgrade of status.      The information on this document is developed by the utilization review team in order for the business office to ensure compliance.  This only denotes the appropriateness of proper admission status and does not reflect the quality of care rendered.         The definitions of Inpatient Status and Observation Status used in making the determination above are those provided in the CMS Coverage Manual, Chapter 1 and Chapter 6, section 70.4.      Sincerely,  Angel Nuno MD  Utilization " Review  Physician Advisor  [facility]

## 2021-11-04 NOTE — PLAN OF CARE
Reviewed Discharge Instructions with patient and his wife. Patient will follow up with his primary MD at [facility] in 7 days. Discussed new medications and possible side effects. All questions answered and patient was happy to be going home. Patient was escorted to the front lobby where his ride was waiting for him.

## 2021-11-04 NOTE — DISCHARGE INSTRUCTIONS
"  Tick Bites  Ticks are small arachnids that feed on the blood of rodents, rabbits, birds, deer, dogs, and people. A tick bite may cause redness, itching, and slight swelling at the site. Sometimes you may have no reaction where the tick bit you.  Ticks transmit disease when microbes in their saliva get into your skin and blood. There are over 800 species of ticks. But only two families of ticks, hard ticks and soft ticks, are known to transmit diseases to humans. Ticks often transmit a disease near the end of a meal. The hard ticks tend to attach and feed for hours to days. It may take hours before a hard tick transmits microbes. Soft ticks often feed for less than 1 hour and can transmit diseases quickly. The bites themselves aren't cause for concern. But ticks can carry and pass on 12 different illnesses. These include Lyme disease and [place] spotted fever.  Symptoms of tick-related diseases vary depending on the disease. The most common symptoms are:    Fever    Chills    Aches and pains such as headache, extreme tiredness (fatigue), and muscle aches    Joint pain (with Lyme disease)    Rash     A \"bull's eye\" rash is a common symptom of Lyme disease.   How to remove a tick  Not all ticks carry disease. A tick attached to you anywhere from minutes to days may infect you, depending on the type of tick and the germs it carries. If you find a tick, don't panic.    Try to carefully remove the tick with tweezers.    Grasp the tick near its head as close to the skin s surface as possible. Pull without twisting and don't crush the body.    After removing the tick, thoroughly clean the bite area and your hands with rubbing alcohol or soap and water.    Put a live tick in alcohol, or in a sealed bag or container, or flush it down the toilet.  When to get medical care  If you can't easily remove the tick or if you leave the head in your skin, get medical care right away.  Tick paralysis is a rare disease thought " to be caused by a toxin in tick saliva. The symptoms include:    Weakness    Paralysis    Confusion    Fever    Numbness    Headaches    Rashes  If you or someone bitten by a tick has these symptoms, get medical care right away. Removing the tick stops the symptoms in about 24 hours.  If you have a rash or fever within a few weeks of removing a tick, see your healthcare provider. Tell the provider about your recent tick bite, when the bite occurred, and where you most likely acquired the tick.    To prevent disease, you may be given antibiotics. Both Lyme disease and [place] spotted fever respond quickly to these medicines.    You may be asked to see your healthcare provider for a blood test. This is to check for Lyme or another tick-related disease.  Follow-up care  Some states and counties have services that test ticks for Lyme disease and other diseases. Check with your local officials to see if this service is available in your area.  If you remove a tick yourself, watch for signs of a tick-borne illness. Symptoms may show up in a few days or weeks after a bite. Call your healthcare provider if you notice any of the following:    Rash. This may spread outward in a ring from a hard, white lump. Or it may move up your arms and legs to your chest.    Fever    Chills    Body aches, joint swelling, and pain    Severe headache  [employer] last reviewed this educational content on 8/1/2019 2000-2021 [employer]. All rights reserved. This information is not intended as a substitute for professional medical care. Always follow your healthcare professional's instructions.        Doxycycline Hyclate Delayed Release Capsule 75 mg  Uses  For treating bacterial infection.  Instructions  Swallow the medicine without crushing or chewing it.  Take the medicine with 250 mL (1 cup) of water.  Take on empty stomach - 1 hour before or 2 hours after eating.  Sit or stand upright for 30 minutes after taking the  medicine. Do not lie down.  Keep the medicine at room temperature. Avoid heat and direct light.  Do not take any antacid or vitamins with magnesium, calcium, aluminum, or iron for 2 hours before and 2 hours after taking this medicine.  This medicine may cause you to become more sensitive to the sun. Use sunscreen or wear protective clothing when you are exposed to the sun.  It is important that you keep taking each dose of this medicine on time even if you are feeling well.  If you forget to take a dose on time, take it as soon as you remember. If it is almost time for the next dose, do not take the missed dose. Return to your normal dosing schedule. Do not take 2 doses of this medicine at one time.  Please tell your doctor and pharmacist about all the medicines you take. Include both prescription and over-the-counter medicines. Also tell them about any vitamins, herbal medicines, or anything else you take for your health.  It is very important that you continue using this medicine for the full number of days that it is prescribed. Please do not stop the medicine even if you start to feel better after the first few days.  This medicine can cause permanent change in teeth color in children.  Cautions  Tell your doctor and pharmacist if you ever had an allergic reaction to a medicine. Symptoms of an allergic reaction can include trouble breathing, skin rash, itching, swelling, or severe dizziness.  Do not use the medication any more than instructed.  Contact your doctor if you notice a change in the amount or darkening of your urine.  Please tell your doctor if you have moderate to severe diarrhea while on this medicine. Do not treat the diarrhea with over-the-counter diarrhea medicine.  Tell the doctor or pharmacist if you are pregnant, planning to be pregnant, or breastfeeding.  Ask your pharmacist if this medicine can interact with any of your other medicines. Be sure to tell them about all the medicines you  take.  Please tell all your doctors and dentists that you are on this medicine before they provide care.  Do not start or stop any other medicines without first speaking to your doctor or pharmacist.  Do not share this medicine with anyone who has not been prescribed this medicine.  Side Effects  The following is a list of some common side effects from this medicine. Please speak with your doctor about what you should do if you experience these or other side effects.    diarrhea    nausea    stomach upset or abdominal pain    increased risk of sunburn    yeast infection of mouth    vaginal itching or yeast infection    vomiting  Call your doctor or get medical help right away if you notice any of these more serious side effects:    swelling in the neck or throat    difficulty swallowing    blurring or changes of vision  A few people may have an allergic reactions to this medicine. Symptoms can include difficulty breathing, skin rash, itching, swelling, or severe dizziness. If you notice any of these symptoms, seek medical help quickly.  Extra  Please speak with your doctor, nurse, or pharmacist if you have any questions about this medicine.  [email or web address]  IMPORTANT NOTE: This document tells you briefly how to take your medicine, but it does not tell you all there is to know about it.Your doctor or pharmacist may give you other documents about your medicine. Please talk to them if you have any questions.Always follow their advice. There is a more complete description of this medicine available in English.Scan this code on your smartphone or tablet or use the web address below. You can also ask your pharmacist for a printout. If you have any questions, please ask your pharmacist.     2021 [employer].

## 2021-11-04 NOTE — PROGRESS NOTES
"INFECTIOUS DISEASE FOLLOW UP NOTE    Date: 2021   CHIEF COMPLAINT:   Chief Complaint   Patient presents with     Syncope     Headache        ASSESSMENT:  61M with recent tick exposure admitted with syncope, chills  1. Chills/sweats, headaches, fatigue, elevated LFTs, thrombocytopenia: with recent tick exposure concerning for tick borne illness most likely anaplasmosis or ehrlichia but will evaluate for babesia-smear negative. Had normal LFTs 6 weeks ago. Low concern for meningitis based on reassuring clinical exam; LP with 1 WBC, MRI normal. Active issue.   2. Unvaccinated COVID: plans on getting vaccinated when he feels better, encouraged this.     PLAN:  - doxycycline 100mg PO BID for 10 days  - Please take doxycyline with a glass of water. Do not lie down for one hour after taking doxycycline as it can irritate the esophagus. Be cautious about sun exposure as it may make you more likely to get sunburn.   - tick studies pending  - LFTs, cbc improving  - ok to discharge from ID standpoint  - COVID vaccine CLAIREP    Mony Eaton MD  [facility]   On-Call: [phone]     ______________________________________________________________________    SUBJECTIVE / INTERVAL HISTORY: rough night, sweats and headaches but feels much better this morning. Headache gone. Chills better. Hasn't taken any pain medication for a while.     ROS: All other systems negative except as listed above.    SH/FH/Habits/PMH reviewed and unchanged.    OBJECTIVE:  /57   Pulse 74   Temp 97.5  F (36.4  C) (Oral)   Resp 16   Ht 1.727 m (5' 8\")   Wt 81.9 kg (180 lb 9.6 oz)   SpO2 97%   BMI 27.46 kg/m       Resp: 16      Vital Signs  Temp: 97.5  F (36.4  C)  Temp src: Oral  Resp: 16  Pulse: 74  Pulse Rate Source: Monitor  BP: 111/57  BP Location: Right arm    Temp (24hrs), Av.5  F (36.9  C), Min:97.2  F (36.2  C), Max:102.2  F (39  C)      GEN: No acute distress.    RESPIRATORY:  Normal breathing " pattern.   CARDIOVASCULAR:  Regular rate  EXTREMITIES: No edema.  SKIN/HAIR/NAILS:  No rashes  IV: peripheral IV        Antibiotics:  Doxycyline PO     Pertinent labs:  CRP   Date Value Ref Range Status   11/03/2021 11.1 (H) 0.0-<0.8 mg/dL Final      CBC RESULTS:   Recent Labs   Lab Test 11/04/21  0529   WBC 8.6   RBC 4.07*   HGB 12.5*   HCT 38.0*   MCV 93   MCH 30.7   MCHC 32.9   RDW 13.3   PLT 95*      Last Comprehensive Metabolic Panel:  Sodium   Date Value Ref Range Status   11/04/2021 134 (L) 136 - 145 mmol/L Final     Potassium   Date Value Ref Range Status   11/04/2021 4.1 3.5 - 5.0 mmol/L Final     Chloride   Date Value Ref Range Status   11/04/2021 103 98 - 107 mmol/L Final     Carbon Dioxide (CO2)   Date Value Ref Range Status   11/04/2021 23 22 - 31 mmol/L Final     Anion Gap   Date Value Ref Range Status   11/04/2021 8 5 - 18 mmol/L Final     Glucose   Date Value Ref Range Status   11/04/2021 160 (H) 70 - 125 mg/dL Final     GLUCOSE BY METER POCT   Date Value Ref Range Status   11/04/2021 161 (H) 70 - 99 mg/dL Final     Urea Nitrogen   Date Value Ref Range Status   11/04/2021 15 8 - 22 mg/dL Final     Creatinine   Date Value Ref Range Status   11/04/2021 1.01 0.70 - 1.30 mg/dL Final     GFR Estimate   Date Value Ref Range Status   11/04/2021 80 >60 mL/min/1.73m2 Final     Comment:     As of July 11, 2021, eGFR is calculated by the CKD-EPI creatinine equation, without race adjustment. eGFR can be influenced by muscle mass, exercise, and diet. The reported eGFR is an estimation only and is only applicable if the renal function is stable.     Calcium   Date Value Ref Range Status   11/04/2021 8.6 8.5 - 10.5 mg/dL Final        MICROBIOLOGY DATA:  Personally reviewed.  Peripheral smear negative for infectious etiology    11/3 CSF cultures pending. GS PMNs     CSF:    4          Color Colorless Colorless      Clarity Clear Clear      WBC Cells Counted   1      RBC Cells Counted   2                 RADIOLOGY:  Personally Reviewed.  Recent Results (from the past 24 hour(s))   Echocardiogram Complete    Narrative    [id number]  [id number]  [id number]  [id number]^CLEOPATRA^MICK^[place] [address]     Name: DELMI BAKER  MRN: [id number]  : 1960  Study Date: 2021 12:57 PM  Age: 61 yrs  Gender: Male  Patient Location: [facility]  Reason For Study: Syncope and Collapse  Ordering Physician: MICK WHELAN  Performed By: HORTENCIA     BSA: 2.0 m2  Height: 68 in  Weight: 180 lb  HR: 100  ______________________________________________________________________________  ______________________________________________________________________________  Interpretation Summary     1. Normal left ventricular size and systolic performance with a visually  estimated ejection fraction of 65%.  2. No significant valvular heart disease is identified on this study.  3. Normal right ventricular size and systolic performance.  4. There is mild left atrial enlargement.  5. There is a closure device spanning the atrial septum. No flow was detected  around or through the device upon color Doppler interrogation.  ______________________________________________________________________________  Left ventricle:  Normal left ventricular size and systolic performance with a visually  estimated ejection fraction of 65%. There is normal regional wall motion. Left  ventricular wall thickness is normal.     Assessment of LV Diastolic Function: The cumulative findings are indeterminate  in the evaluation of diastolic function [The septal e' velocity is < 7 cm/s &  lateral e' velocity is < 10 cm/s. The average E/e' is < 14. The TR velocity  cannot be determined due to insufficient tricuspid insufficiency signal. Left  atrial volume index is greater than 34 mL/mÂ ].     Right ventricle:  Normal right ventricular size and systolic performance.     Left atrium:  There is mild left atrial  enlargement.     Right atrium:  The right atrium is of normal size.     IVC:  The IVC is of normal caliber.     Aortic valve:  The aortic valve is comprised of three cusps. No significant aortic stenosis  or aortic insufficiency is detected on this study.     Mitral valve:  The mitral valve appears morphologically normal. There is mild mitral  insufficiency.     Tricuspid valve:  The tricuspid valve is grossly morphologically normal. There is trace  tricuspid insufficiency.     Pulmonic valve:  The pulmonic valve is grossly morphologically normal.     Thoracic aorta:  The aortic root and proximal ascending aorta are of normal dimension.     Pericardium:  There is no significant pericardial effusion.  ______________________________________________________________________________  ______________________________________________________________________________  MMode/2D Measurements & Calculations  IVSd: 1.1 cm  LVIDd: 4.2 cm  LVIDs: 2.9 cm  LVPWd: 1.2 cm  FS: 32.3 %  LV mass(C)d: 169.2 grams  LV mass(C)dI: 86.6 grams/m2  Ao root diam: 3.1 cm  LA dimension: 4.0 cm  asc Aorta Diam: 2.8 cm  LA/Ao: 1.3  LVOT diam: 2.0 cm  LVOT area: 3.0 cm2     LA Volume Indexed (AL/bp): 35.1 ml/m2  RWT: 0.58     Doppler Measurements & Calculations  MV E max bettina: 91.7 cm/sec  MV A max bettina: 103.8 cm/sec  MV E/A: 0.88  MV dec slope: 448.3 cm/sec2  MV dec time: 0.21 sec  LV V1 max P.0 mmHg  LV V1 max: 131.9 cm/sec  LV V1 VTI: 20.2 cm  SV(LVOT): 61.1 ml  SI(LVOT): 31.3 ml/m2  PA acc time: 0.08 sec  E/E' av.8  Lateral E/e': 10.4  Medial E/e': 17.2     ______________________________________________________________________________  Report approved by: Lonnie Hector 2021 02:06 PM             Principal Problem:    Syncope  Active Problems:    Encephalitis    Acute nonintractable headache, unspecified headache type    Thrombocytopenia (H)

## 2021-11-06 LAB
WNV IGG CSF IA-ACNC: 0.08 IV
WNV IGM CSF IA-ACNC: 0.04 IV

## 2021-11-07 LAB
A PHAGOCYTOPH DNA BLD QL NAA+PROBE: DETECTED
A PHAGOCYTOPH IGG TITR SER IF: ABNORMAL {TITER}
A PHAGOCYTOPH IGM TITR SER IF: ABNORMAL {TITER}
B MICROTI DNA BLD QL NAA+PROBE: NOT DETECTED
BABESIA DNA BLD QL NAA+PROBE: NOT DETECTED
E CHAFFEENSIS DNA BLD QL NAA+PROBE: NOT DETECTED
E EWINGII DNA SPEC QL NAA+PROBE: NOT DETECTED
EHRLICHIA DNA SPEC QL NAA+PROBE: NOT DETECTED

## 2021-11-08 LAB
BACTERIA CSF CULT: NO GROWTH
GRAM STAIN RESULT: NORMAL
GRAM STAIN RESULT: NORMAL

## 2021-11-13 LAB
B BURGDOR IGG CSF QL IB: NEGATIVE
B BURGDOR IGM CSF QL IB: NEGATIVE

## 2022-02-06 ENCOUNTER — HEALTH MAINTENANCE LETTER (OUTPATIENT)
Age: 62
End: 2022-02-06

## 2022-05-28 ENCOUNTER — HEALTH MAINTENANCE LETTER (OUTPATIENT)
Age: 62
End: 2022-05-28

## 2022-10-01 ENCOUNTER — HEALTH MAINTENANCE LETTER (OUTPATIENT)
Age: 62
End: 2022-10-01

## 2023-02-05 ENCOUNTER — HEALTH MAINTENANCE LETTER (OUTPATIENT)
Age: 63
End: 2023-02-05

## 2023-05-14 ENCOUNTER — HEALTH MAINTENANCE LETTER (OUTPATIENT)
Age: 63
End: 2023-05-14

## 2023-08-12 ENCOUNTER — HEALTH MAINTENANCE LETTER (OUTPATIENT)
Age: 63
End: 2023-08-12

## 2024-03-09 ENCOUNTER — HEALTH MAINTENANCE LETTER (OUTPATIENT)
Age: 64
End: 2024-03-09

## 2024-05-18 ENCOUNTER — HEALTH MAINTENANCE LETTER (OUTPATIENT)
Age: 64
End: 2024-05-18

## 2024-09-29 ENCOUNTER — HEALTH MAINTENANCE LETTER (OUTPATIENT)
Age: 64
End: 2024-09-29